# Patient Record
Sex: MALE | HISPANIC OR LATINO | Employment: FULL TIME | URBAN - METROPOLITAN AREA
[De-identification: names, ages, dates, MRNs, and addresses within clinical notes are randomized per-mention and may not be internally consistent; named-entity substitution may affect disease eponyms.]

---

## 2019-04-08 ENCOUNTER — OFFICE VISIT (OUTPATIENT)
Dept: UROLOGY | Facility: CLINIC | Age: 44
End: 2019-04-08
Payer: COMMERCIAL

## 2019-04-08 VITALS
SYSTOLIC BLOOD PRESSURE: 145 MMHG | BODY MASS INDEX: 28.45 KG/M2 | DIASTOLIC BLOOD PRESSURE: 84 MMHG | HEART RATE: 74 BPM | HEIGHT: 66 IN | TEMPERATURE: 99 F | WEIGHT: 177 LBS

## 2019-04-08 DIAGNOSIS — Z12.5 PROSTATE CANCER SCREENING: ICD-10-CM

## 2019-04-08 LAB
BILIRUB SERPL-MCNC: NORMAL MG/DL
BLOOD URINE, POC: NORMAL
COLOR, POC UA: YELLOW
GLUCOSE UR QL STRIP: NORMAL
KETONES UR QL STRIP: NORMAL
LEUKOCYTE ESTERASE URINE, POC: NORMAL
NITRITE, POC UA: NORMAL
PH, POC UA: 5
PROTEIN, POC: NORMAL
SPECIFIC GRAVITY, POC UA: 1.01
UROBILINOGEN, POC UA: NORMAL

## 2019-04-08 PROCEDURE — 3008F BODY MASS INDEX DOCD: CPT | Mod: CPTII,S$GLB,, | Performed by: STUDENT IN AN ORGANIZED HEALTH CARE EDUCATION/TRAINING PROGRAM

## 2019-04-08 PROCEDURE — 99999 PR PBB SHADOW E&M-NEW PATIENT-LVL III: CPT | Mod: PBBFAC,,, | Performed by: STUDENT IN AN ORGANIZED HEALTH CARE EDUCATION/TRAINING PROGRAM

## 2019-04-08 PROCEDURE — 81002 URINALYSIS NONAUTO W/O SCOPE: CPT | Mod: S$GLB,,, | Performed by: STUDENT IN AN ORGANIZED HEALTH CARE EDUCATION/TRAINING PROGRAM

## 2019-04-08 PROCEDURE — 99999 PR PBB SHADOW E&M-NEW PATIENT-LVL III: ICD-10-PCS | Mod: PBBFAC,,, | Performed by: STUDENT IN AN ORGANIZED HEALTH CARE EDUCATION/TRAINING PROGRAM

## 2019-04-08 PROCEDURE — 99204 OFFICE O/P NEW MOD 45 MIN: CPT | Mod: 25,S$GLB,, | Performed by: STUDENT IN AN ORGANIZED HEALTH CARE EDUCATION/TRAINING PROGRAM

## 2019-04-08 PROCEDURE — 99204 PR OFFICE/OUTPT VISIT, NEW, LEVL IV, 45-59 MIN: ICD-10-PCS | Mod: 25,S$GLB,, | Performed by: STUDENT IN AN ORGANIZED HEALTH CARE EDUCATION/TRAINING PROGRAM

## 2019-04-08 PROCEDURE — 3008F PR BODY MASS INDEX (BMI) DOCUMENTED: ICD-10-PCS | Mod: CPTII,S$GLB,, | Performed by: STUDENT IN AN ORGANIZED HEALTH CARE EDUCATION/TRAINING PROGRAM

## 2019-04-08 PROCEDURE — 81002 POCT URINE DIPSTICK WITHOUT MICROSCOPE: ICD-10-PCS | Mod: S$GLB,,, | Performed by: STUDENT IN AN ORGANIZED HEALTH CARE EDUCATION/TRAINING PROGRAM

## 2019-04-08 RX ORDER — METHYLPREDNISOLONE 4 MG/1
1 TABLET ORAL ONCE
COMMUNITY

## 2019-04-08 RX ORDER — ELUXADOLINE 75 MG/1
1 TABLET, FILM COATED ORAL 2 TIMES DAILY
Refills: 2 | COMMUNITY
Start: 2019-03-27

## 2019-04-08 RX ORDER — DICLOFENAC SODIUM 20 MG/G
1 SOLUTION TOPICAL 2 TIMES DAILY
COMMUNITY

## 2019-04-08 RX ORDER — SUCRALFATE 1 G/1
TABLET ORAL
COMMUNITY

## 2019-04-08 RX ORDER — OMEPRAZOLE 40 MG/1
CAPSULE, DELAYED RELEASE ORAL
COMMUNITY

## 2019-04-08 NOTE — PROGRESS NOTES
"Subjective:       Patient ID: Marcelo Frankel is a 44 y.o. male.    Chief Complaint:  "prostate check"  This is a 44 y.o.  male patient that is new to me.  The patient is self referred to me for "a prostate check". He notes he was experiencing rectal pain that started "all of a sudden." he notes that the pain was uncomfortable. He notes the pain has resolved, it was present for 2 days. He notes he is not having any constipation.   He does not have a family history of prostate cancer. Maternal uncle with enlarged prostate.   Primary care physician - Dr. Kulwinder Veloz, on [facility].       Lab Results   Component Value Date    CREATININE 1.1 04/22/2014     ---  Past Medical History:   Diagnosis Date    Kidney stone        Past Surgical History:   Procedure Laterality Date    APPENDECTOMY  4/23/14    Laparoscopic appendectomy.    APPENDECTOMY, LAPAROSCOPIC N/A 4/23/2014    Performed by Vinod Perez [facility] at [facility] OR    APPENDECTOMY, LAPAROSCOPIC N/A 4/23/2014    Performed by Vinod Perez MD at [facility] OR       Family History   Problem Relation Age of Onset    Hyperlipidemia Mother     Prostate cancer Neg Hx     Kidney disease Neg Hx        Social History     Tobacco Use    Smoking status: Never Smoker    Smokeless tobacco: Never Used   Substance Use Topics    Alcohol use: No    Drug use: No       Current Outpatient Medications on File Prior to Visit   Medication Sig Dispense Refill    amitriptyline-chlordiazepoxide (LIMBITROL) 12.5-5 mg per tablet Start Limbitrol 12.5.5 at ¼ (quater) an hour before bedtime, if no relief to increase to ½ (half) the following week. 30 tablet 2    ammonium lactate (LAC-HYDRIN) 12 % lotion       clotrimazole-betamethasone 1-0.05% (LOTRISONE) cream Apply topically 2 (two) times daily. 15 g 1    diclofenac sodium (PENNSAID) 20 mg/gram /actuation(2 %) sopm Apply 1 Pump topically 2 (two) times daily.      doxycycline (MONODOX) 100 MG capsule Take 1 capsule (100 mg " total) by mouth once daily. 14 capsule 0    ibuprofen (ADVIL,MOTRIN) 600 MG tablet Take 1 tablet (600 mg total) by mouth every 6 (six) hours as needed for Pain. 30 tablet 0    meloxicam (MOBIC) 15 MG tablet   2    methylPREDNISolone (MEDROL) 4 MG Tab Take 1 Dose by mouth once.      omeprazole (PRILOSEC) 40 MG capsule omeprazole 40 mg capsule,delayed release      sucralfate (CARAFATE) 1 gram tablet sucralfate 1 gram tablet      rizatriptan (MAXALT-MLT) 5 MG disintegrating tablet Take 1 tablet (5 mg total) by mouth as needed for Migraine. May repeat in 2 hours if needed 20 tablet 3    VIBERZI 75 mg Tab Take 1 tablet by mouth 2 (two) times daily.  2     No current facility-administered medications on file prior to visit.        Review of patient's allergies indicates:  No Known Allergies    Review of Systems   Constitutional: Negative for chills.   HENT: Negative for congestion.    Eyes: Negative for visual disturbance.   Respiratory: Negative for shortness of breath.    Cardiovascular: Negative for chest pain.   Gastrointestinal: Negative for abdominal distention.   Musculoskeletal: Negative for gait problem.   Skin: Negative for color change.   Neurological: Negative for dizziness.   Psychiatric/Behavioral: Negative for agitation.       Objective:      Physical Exam   Constitutional: He appears well-developed and well-nourished.   HENT:   Head: Normocephalic.   Eyes: Pupils are equal, round, and reactive to light.   Neck: Normal range of motion.   Cardiovascular: Intact distal pulses.   Pulmonary/Chest: Effort normal.   Abdominal: Soft.   Musculoskeletal: Normal range of motion.   Neurological: He is alert.   Skin: Skin is warm and dry.   Psychiatric: He has a normal mood and affect.       Assessment:       1. Prostate cancer screening        Plan:       1. The Panel does not recommend routine screening in men between ages 40 to 54 years at average risk. (Recommendation; Evidence Strength Grade C)For men  younger than age 55 years at higher risk, decisions regarding prostate cancer screening should be individualized. Those at higher risk may include men of  race; and those with a family history of metastatic or lethal adenocarcinomas (e.g., prostate, male and female breast cancer, ovarian, pancreatic) spanning multiple generations, affecting multiple first-degree relatives, and that developed at younger ages.   https://www.auanet.org/guidelines/prostate-cancer-early-detection-(2013-reviewed-for-currency-2018)  2. Patient does not have a family history of prostate cancer, nor fit the criteria of being at higher risk.   3. PSA screening per AUA guidelines with digital rectal exam and PSA starting age 55.   4. Continue routine followup with PCP.    Prostate cancer screening  -     POCT URINE DIPSTICK WITHOUT MICROSCOPE

## 2019-04-08 NOTE — PATIENT INSTRUCTIONS
https://www.auanet.org/guidelines/prostate-cancer-early-detection-(2013-reviewed-for-currency-2018)